# Patient Record
Sex: MALE | Race: BLACK OR AFRICAN AMERICAN | NOT HISPANIC OR LATINO | URBAN - METROPOLITAN AREA
[De-identification: names, ages, dates, MRNs, and addresses within clinical notes are randomized per-mention and may not be internally consistent; named-entity substitution may affect disease eponyms.]

---

## 2023-11-23 ENCOUNTER — APPOINTMENT (OUTPATIENT)
Dept: RADIOLOGY | Facility: MEDICAL CENTER | Age: 38
DRG: 699 | End: 2023-11-23
Attending: EMERGENCY MEDICINE
Payer: COMMERCIAL

## 2023-11-23 ENCOUNTER — HOSPITAL ENCOUNTER (INPATIENT)
Facility: MEDICAL CENTER | Age: 38
LOS: 1 days | DRG: 699 | End: 2023-11-24
Attending: EMERGENCY MEDICINE | Admitting: STUDENT IN AN ORGANIZED HEALTH CARE EDUCATION/TRAINING PROGRAM
Payer: COMMERCIAL

## 2023-11-23 DIAGNOSIS — G89.4 CHRONIC PAIN SYNDROME: ICD-10-CM

## 2023-11-23 DIAGNOSIS — Z89.511 S/P BKA (BELOW KNEE AMPUTATION) BILATERAL (HCC): ICD-10-CM

## 2023-11-23 DIAGNOSIS — E66.01 CLASS 2 SEVERE OBESITY DUE TO EXCESS CALORIES WITH SERIOUS COMORBIDITY IN ADULT, UNSPECIFIED BMI (HCC): ICD-10-CM

## 2023-11-23 DIAGNOSIS — Z99.2 ESRD NEEDING DIALYSIS (HCC): ICD-10-CM

## 2023-11-23 DIAGNOSIS — N18.6 ESRD NEEDING DIALYSIS (HCC): ICD-10-CM

## 2023-11-23 DIAGNOSIS — I16.0 HYPERTENSIVE URGENCY: ICD-10-CM

## 2023-11-23 DIAGNOSIS — F41.9 ANXIETY: ICD-10-CM

## 2023-11-23 DIAGNOSIS — R11.2 NAUSEA VOMITING AND DIARRHEA: ICD-10-CM

## 2023-11-23 DIAGNOSIS — E87.20 METABOLIC ACIDOSIS: ICD-10-CM

## 2023-11-23 DIAGNOSIS — R11.2 INTRACTABLE NAUSEA AND VOMITING: ICD-10-CM

## 2023-11-23 DIAGNOSIS — Z89.512 S/P BKA (BELOW KNEE AMPUTATION) BILATERAL (HCC): ICD-10-CM

## 2023-11-23 DIAGNOSIS — E08.21 DIABETES MELLITUS DUE TO UNDERLYING CONDITION WITH DIABETIC NEPHROPATHY, WITHOUT LONG-TERM CURRENT USE OF INSULIN (HCC): ICD-10-CM

## 2023-11-23 DIAGNOSIS — D63.8 ANEMIA OF CHRONIC DISEASE: ICD-10-CM

## 2023-11-23 DIAGNOSIS — R19.7 NAUSEA VOMITING AND DIARRHEA: ICD-10-CM

## 2023-11-23 LAB
ANION GAP SERPL CALC-SCNC: 21 MMOL/L (ref 7–16)
BASOPHILS # BLD AUTO: 0.4 % (ref 0–1.8)
BASOPHILS # BLD: 0.04 K/UL (ref 0–0.12)
BUN SERPL-MCNC: 72 MG/DL (ref 8–22)
CALCIUM SERPL-MCNC: 8.1 MG/DL (ref 8.5–10.5)
CHLORIDE SERPL-SCNC: 101 MMOL/L (ref 96–112)
CO2 SERPL-SCNC: 16 MMOL/L (ref 20–33)
CORTIS SERPL-MCNC: 14.6 UG/DL (ref 0–23)
CREAT SERPL-MCNC: 16.89 MG/DL (ref 0.5–1.4)
EKG IMPRESSION: NORMAL
EOSINOPHIL # BLD AUTO: 0.35 K/UL (ref 0–0.51)
EOSINOPHIL NFR BLD: 3.7 % (ref 0–6.9)
ERYTHROCYTE [DISTWIDTH] IN BLOOD BY AUTOMATED COUNT: 50.1 FL (ref 35.9–50)
GFR SERPLBLD CREATININE-BSD FMLA CKD-EPI: 3 ML/MIN/1.73 M 2
GLUCOSE SERPL-MCNC: 92 MG/DL (ref 65–99)
HCT VFR BLD AUTO: 23.6 % (ref 42–52)
HGB BLD-MCNC: 7.8 G/DL (ref 14–18)
IMM GRANULOCYTES # BLD AUTO: 0.02 K/UL (ref 0–0.11)
IMM GRANULOCYTES NFR BLD AUTO: 0.2 % (ref 0–0.9)
INR PPP: 1.16 (ref 0.87–1.13)
LACTATE SERPL-SCNC: 1.3 MMOL/L (ref 0.5–2)
LIPASE SERPL-CCNC: 13 U/L (ref 11–82)
LYMPHOCYTES # BLD AUTO: 1.05 K/UL (ref 1–4.8)
LYMPHOCYTES NFR BLD: 11.1 % (ref 22–41)
MCH RBC QN AUTO: 26.9 PG (ref 27–33)
MCHC RBC AUTO-ENTMCNC: 33.1 G/DL (ref 32.3–36.5)
MCV RBC AUTO: 81.4 FL (ref 81.4–97.8)
MONOCYTES # BLD AUTO: 0.6 K/UL (ref 0–0.85)
MONOCYTES NFR BLD AUTO: 6.3 % (ref 0–13.4)
NEUTROPHILS # BLD AUTO: 7.43 K/UL (ref 1.82–7.42)
NEUTROPHILS NFR BLD: 78.3 % (ref 44–72)
NRBC # BLD AUTO: 0 K/UL
NRBC BLD-RTO: 0 /100 WBC (ref 0–0.2)
NT-PROBNP SERPL IA-MCNC: ABNORMAL PG/ML (ref 0–125)
PLATELET # BLD AUTO: 197 K/UL (ref 164–446)
PMV BLD AUTO: 9.7 FL (ref 9–12.9)
POTASSIUM SERPL-SCNC: 4.1 MMOL/L (ref 3.6–5.5)
PROCALCITONIN SERPL-MCNC: 0.29 NG/ML
PROTHROMBIN TIME: 14.9 SEC (ref 12–14.6)
RBC # BLD AUTO: 2.9 M/UL (ref 4.7–6.1)
SODIUM SERPL-SCNC: 138 MMOL/L (ref 135–145)
TROPONIN T SERPL-MCNC: 420 NG/L (ref 6–19)
WBC # BLD AUTO: 9.5 K/UL (ref 4.8–10.8)

## 2023-11-23 PROCEDURE — 99285 EMERGENCY DEPT VISIT HI MDM: CPT

## 2023-11-23 PROCEDURE — 770020 HCHG ROOM/CARE - TELE (206)

## 2023-11-23 PROCEDURE — 93005 ELECTROCARDIOGRAM TRACING: CPT | Performed by: EMERGENCY MEDICINE

## 2023-11-23 PROCEDURE — 80048 BASIC METABOLIC PNL TOTAL CA: CPT

## 2023-11-23 PROCEDURE — 83880 ASSAY OF NATRIURETIC PEPTIDE: CPT

## 2023-11-23 PROCEDURE — A9270 NON-COVERED ITEM OR SERVICE: HCPCS | Performed by: STUDENT IN AN ORGANIZED HEALTH CARE EDUCATION/TRAINING PROGRAM

## 2023-11-23 PROCEDURE — 85025 COMPLETE CBC W/AUTO DIFF WBC: CPT

## 2023-11-23 PROCEDURE — 700111 HCHG RX REV CODE 636 W/ 250 OVERRIDE (IP): Mod: JZ | Performed by: EMERGENCY MEDICINE

## 2023-11-23 PROCEDURE — 96375 TX/PRO/DX INJ NEW DRUG ADDON: CPT

## 2023-11-23 PROCEDURE — 36415 COLL VENOUS BLD VENIPUNCTURE: CPT

## 2023-11-23 PROCEDURE — 96374 THER/PROPH/DIAG INJ IV PUSH: CPT

## 2023-11-23 PROCEDURE — 83605 ASSAY OF LACTIC ACID: CPT

## 2023-11-23 PROCEDURE — 85610 PROTHROMBIN TIME: CPT

## 2023-11-23 PROCEDURE — 83690 ASSAY OF LIPASE: CPT

## 2023-11-23 PROCEDURE — 84145 PROCALCITONIN (PCT): CPT

## 2023-11-23 PROCEDURE — 87040 BLOOD CULTURE FOR BACTERIA: CPT | Mod: 91

## 2023-11-23 PROCEDURE — 700102 HCHG RX REV CODE 250 W/ 637 OVERRIDE(OP): Performed by: STUDENT IN AN ORGANIZED HEALTH CARE EDUCATION/TRAINING PROGRAM

## 2023-11-23 PROCEDURE — 71045 X-RAY EXAM CHEST 1 VIEW: CPT

## 2023-11-23 PROCEDURE — 96376 TX/PRO/DX INJ SAME DRUG ADON: CPT

## 2023-11-23 PROCEDURE — 700111 HCHG RX REV CODE 636 W/ 250 OVERRIDE (IP): Mod: JZ | Performed by: STUDENT IN AN ORGANIZED HEALTH CARE EDUCATION/TRAINING PROGRAM

## 2023-11-23 PROCEDURE — 84484 ASSAY OF TROPONIN QUANT: CPT

## 2023-11-23 PROCEDURE — 99223 1ST HOSP IP/OBS HIGH 75: CPT | Mod: A1 | Performed by: STUDENT IN AN ORGANIZED HEALTH CARE EDUCATION/TRAINING PROGRAM

## 2023-11-23 PROCEDURE — 82533 TOTAL CORTISOL: CPT

## 2023-11-23 RX ORDER — LABETALOL HYDROCHLORIDE 5 MG/ML
10 INJECTION, SOLUTION INTRAVENOUS ONCE
Status: COMPLETED | OUTPATIENT
Start: 2023-11-23 | End: 2023-11-23

## 2023-11-23 RX ORDER — CLONAZEPAM 0.5 MG/1
0.5 TABLET ORAL 2 TIMES DAILY
Status: DISCONTINUED | OUTPATIENT
Start: 2023-11-23 | End: 2023-11-24 | Stop reason: HOSPADM

## 2023-11-23 RX ORDER — ACETAMINOPHEN 325 MG/1
650 TABLET ORAL EVERY 6 HOURS PRN
Status: DISCONTINUED | OUTPATIENT
Start: 2023-11-23 | End: 2023-11-24 | Stop reason: HOSPADM

## 2023-11-23 RX ORDER — ONDANSETRON 2 MG/ML
4 INJECTION INTRAMUSCULAR; INTRAVENOUS ONCE
Status: COMPLETED | OUTPATIENT
Start: 2023-11-23 | End: 2023-11-23

## 2023-11-23 RX ORDER — DIPHENHYDRAMINE HYDROCHLORIDE 50 MG/ML
25 INJECTION INTRAMUSCULAR; INTRAVENOUS ONCE
Status: COMPLETED | OUTPATIENT
Start: 2023-11-23 | End: 2023-11-23

## 2023-11-23 RX ORDER — HEPARIN SODIUM 5000 [USP'U]/ML
5000 INJECTION, SOLUTION INTRAVENOUS; SUBCUTANEOUS EVERY 8 HOURS
Status: DISCONTINUED | OUTPATIENT
Start: 2023-11-24 | End: 2023-11-24 | Stop reason: HOSPADM

## 2023-11-23 RX ORDER — INSULIN GLARGINE 100 [IU]/ML
22 INJECTION, SOLUTION SUBCUTANEOUS EVERY EVENING
COMMUNITY

## 2023-11-23 RX ORDER — MORPHINE SULFATE 4 MG/ML
4 INJECTION INTRAVENOUS ONCE
Status: COMPLETED | OUTPATIENT
Start: 2023-11-23 | End: 2023-11-23

## 2023-11-23 RX ORDER — POLYETHYLENE GLYCOL 3350 17 G/17G
1 POWDER, FOR SOLUTION ORAL
Status: DISCONTINUED | OUTPATIENT
Start: 2023-11-23 | End: 2023-11-24 | Stop reason: HOSPADM

## 2023-11-23 RX ORDER — SODIUM CHLORIDE, SODIUM LACTATE, POTASSIUM CHLORIDE, AND CALCIUM CHLORIDE .6; .31; .03; .02 G/100ML; G/100ML; G/100ML; G/100ML
500 INJECTION, SOLUTION INTRAVENOUS
Status: DISCONTINUED | OUTPATIENT
Start: 2023-11-23 | End: 2023-11-24 | Stop reason: HOSPADM

## 2023-11-23 RX ORDER — ASPIRIN 81 MG/1
81 TABLET ORAL DAILY
Status: DISCONTINUED | OUTPATIENT
Start: 2023-11-24 | End: 2023-11-24 | Stop reason: HOSPADM

## 2023-11-23 RX ORDER — METOCLOPRAMIDE HYDROCHLORIDE 5 MG/ML
5 INJECTION INTRAMUSCULAR; INTRAVENOUS EVERY 6 HOURS
Status: DISCONTINUED | OUTPATIENT
Start: 2023-11-24 | End: 2023-11-24 | Stop reason: HOSPADM

## 2023-11-23 RX ORDER — PROCHLORPERAZINE EDISYLATE 5 MG/ML
5-10 INJECTION INTRAMUSCULAR; INTRAVENOUS EVERY 4 HOURS PRN
Status: DISCONTINUED | OUTPATIENT
Start: 2023-11-23 | End: 2023-11-24 | Stop reason: HOSPADM

## 2023-11-23 RX ORDER — BISACODYL 10 MG
10 SUPPOSITORY, RECTAL RECTAL
Status: DISCONTINUED | OUTPATIENT
Start: 2023-11-23 | End: 2023-11-24 | Stop reason: HOSPADM

## 2023-11-23 RX ORDER — PROMETHAZINE HYDROCHLORIDE 25 MG/1
12.5-25 TABLET ORAL EVERY 4 HOURS PRN
Status: DISCONTINUED | OUTPATIENT
Start: 2023-11-23 | End: 2023-11-24 | Stop reason: HOSPADM

## 2023-11-23 RX ORDER — LABETALOL HYDROCHLORIDE 5 MG/ML
10 INJECTION, SOLUTION INTRAVENOUS EVERY 4 HOURS PRN
Status: DISCONTINUED | OUTPATIENT
Start: 2023-11-23 | End: 2023-11-24 | Stop reason: HOSPADM

## 2023-11-23 RX ORDER — AMLODIPINE BESYLATE 5 MG/1
5 TABLET ORAL DAILY
COMMUNITY

## 2023-11-23 RX ORDER — OXYCODONE HYDROCHLORIDE 10 MG/1
10 TABLET ORAL EVERY 6 HOURS PRN
COMMUNITY

## 2023-11-23 RX ORDER — ONDANSETRON 2 MG/ML
4 INJECTION INTRAMUSCULAR; INTRAVENOUS EVERY 4 HOURS PRN
Status: DISCONTINUED | OUTPATIENT
Start: 2023-11-23 | End: 2023-11-24 | Stop reason: HOSPADM

## 2023-11-23 RX ORDER — CLONAZEPAM 0.5 MG/1
0.5 TABLET ORAL 2 TIMES DAILY
Status: ON HOLD | COMMUNITY
End: 2023-11-24 | Stop reason: SDUPTHER

## 2023-11-23 RX ORDER — AMOXICILLIN 250 MG
2 CAPSULE ORAL 2 TIMES DAILY
Status: DISCONTINUED | OUTPATIENT
Start: 2023-11-23 | End: 2023-11-24 | Stop reason: HOSPADM

## 2023-11-23 RX ORDER — OXYCODONE HYDROCHLORIDE 10 MG/1
10 TABLET ORAL EVERY 6 HOURS PRN
Status: DISCONTINUED | OUTPATIENT
Start: 2023-11-23 | End: 2023-11-24 | Stop reason: HOSPADM

## 2023-11-23 RX ORDER — AMLODIPINE BESYLATE 10 MG/1
10 TABLET ORAL
Status: DISCONTINUED | OUTPATIENT
Start: 2023-11-23 | End: 2023-11-24 | Stop reason: HOSPADM

## 2023-11-23 RX ORDER — HYDROMORPHONE HYDROCHLORIDE 1 MG/ML
0.5 INJECTION, SOLUTION INTRAMUSCULAR; INTRAVENOUS; SUBCUTANEOUS EVERY 4 HOURS PRN
Status: DISCONTINUED | OUTPATIENT
Start: 2023-11-23 | End: 2023-11-24 | Stop reason: HOSPADM

## 2023-11-23 RX ORDER — ATORVASTATIN CALCIUM 40 MG/1
40 TABLET, FILM COATED ORAL EVERY EVENING
Status: DISCONTINUED | OUTPATIENT
Start: 2023-11-23 | End: 2023-11-24 | Stop reason: HOSPADM

## 2023-11-23 RX ORDER — HYDROMORPHONE HYDROCHLORIDE 1 MG/ML
1 INJECTION, SOLUTION INTRAMUSCULAR; INTRAVENOUS; SUBCUTANEOUS ONCE
Status: COMPLETED | OUTPATIENT
Start: 2023-11-23 | End: 2023-11-23

## 2023-11-23 RX ORDER — PROMETHAZINE HYDROCHLORIDE 25 MG/1
12.5-25 SUPPOSITORY RECTAL EVERY 4 HOURS PRN
Status: DISCONTINUED | OUTPATIENT
Start: 2023-11-23 | End: 2023-11-24 | Stop reason: HOSPADM

## 2023-11-23 RX ORDER — ONDANSETRON 4 MG/1
4 TABLET, ORALLY DISINTEGRATING ORAL EVERY 4 HOURS PRN
Status: DISCONTINUED | OUTPATIENT
Start: 2023-11-23 | End: 2023-11-24 | Stop reason: HOSPADM

## 2023-11-23 RX ADMIN — MORPHINE SULFATE 4 MG: 4 INJECTION, SOLUTION INTRAMUSCULAR; INTRAVENOUS at 18:48

## 2023-11-23 RX ADMIN — ONDANSETRON 4 MG: 2 INJECTION INTRAMUSCULAR; INTRAVENOUS at 22:35

## 2023-11-23 RX ADMIN — DIPHENHYDRAMINE HYDROCHLORIDE 25 MG: 50 INJECTION, SOLUTION INTRAMUSCULAR; INTRAVENOUS at 22:48

## 2023-11-23 RX ADMIN — CLONAZEPAM 0.5 MG: 0.5 TABLET ORAL at 23:56

## 2023-11-23 RX ADMIN — HYDROMORPHONE HYDROCHLORIDE 1 MG: 1 INJECTION, SOLUTION INTRAMUSCULAR; INTRAVENOUS; SUBCUTANEOUS at 22:35

## 2023-11-23 RX ADMIN — ATORVASTATIN CALCIUM 40 MG: 40 TABLET, FILM COATED ORAL at 23:56

## 2023-11-23 RX ADMIN — METOCLOPRAMIDE 5 MG: 5 INJECTION, SOLUTION INTRAMUSCULAR; INTRAVENOUS at 23:55

## 2023-11-23 RX ADMIN — DIPHENHYDRAMINE HYDROCHLORIDE 25 MG: 50 INJECTION, SOLUTION INTRAMUSCULAR; INTRAVENOUS at 23:55

## 2023-11-23 RX ADMIN — LABETALOL HYDROCHLORIDE 10 MG: 5 INJECTION, SOLUTION INTRAVENOUS at 18:41

## 2023-11-23 RX ADMIN — ONDANSETRON 4 MG: 2 INJECTION INTRAMUSCULAR; INTRAVENOUS at 18:48

## 2023-11-23 RX ADMIN — LABETALOL HYDROCHLORIDE 10 MG: 5 INJECTION, SOLUTION INTRAVENOUS at 23:55

## 2023-11-23 RX ADMIN — HYDROMORPHONE HYDROCHLORIDE 1 MG: 1 INJECTION, SOLUTION INTRAMUSCULAR; INTRAVENOUS; SUBCUTANEOUS at 19:45

## 2023-11-24 VITALS
SYSTOLIC BLOOD PRESSURE: 144 MMHG | BODY MASS INDEX: 37.12 KG/M2 | DIASTOLIC BLOOD PRESSURE: 76 MMHG | OXYGEN SATURATION: 96 % | HEART RATE: 92 BPM | TEMPERATURE: 96.8 F | WEIGHT: 289.24 LBS | RESPIRATION RATE: 16 BRPM | HEIGHT: 74 IN

## 2023-11-24 PROBLEM — R11.2 INTRACTABLE NAUSEA AND VOMITING: Status: ACTIVE | Noted: 2023-11-24

## 2023-11-24 PROBLEM — D63.8 ANEMIA OF CHRONIC DISEASE: Status: ACTIVE | Noted: 2023-11-24

## 2023-11-24 PROBLEM — I16.0 HYPERTENSIVE URGENCY: Status: ACTIVE | Noted: 2023-11-24

## 2023-11-24 PROBLEM — E11.9 DIABETES (HCC): Status: ACTIVE | Noted: 2023-11-24

## 2023-11-24 PROBLEM — T87.89 NON-HEALING WOUND OF AMPUTATION STUMP (HCC): Status: ACTIVE | Noted: 2023-11-24

## 2023-11-24 PROBLEM — G89.4 CHRONIC PAIN SYNDROME: Status: ACTIVE | Noted: 2023-11-24

## 2023-11-24 PROBLEM — E66.9 CLASS 2 OBESITY IN ADULT: Status: ACTIVE | Noted: 2023-11-24

## 2023-11-24 PROBLEM — E87.20 METABOLIC ACIDOSIS: Status: ACTIVE | Noted: 2023-11-24

## 2023-11-24 PROBLEM — Z89.511 S/P BKA (BELOW KNEE AMPUTATION) BILATERAL (HCC): Status: ACTIVE | Noted: 2023-11-24

## 2023-11-24 PROBLEM — I16.1 HYPERTENSIVE EMERGENCY: Status: ACTIVE | Noted: 2023-11-24

## 2023-11-24 PROBLEM — Z89.512 S/P BKA (BELOW KNEE AMPUTATION) BILATERAL (HCC): Status: ACTIVE | Noted: 2023-11-24

## 2023-11-24 PROBLEM — I16.0 HYPERTENSIVE URGENCY: Status: RESOLVED | Noted: 2023-11-24 | Resolved: 2023-11-24

## 2023-11-24 LAB
ABO + RH BLD: NORMAL
ABO GROUP BLD: NORMAL
ALBUMIN SERPL BCP-MCNC: 2.7 G/DL (ref 3.2–4.9)
BARCODED ABORH UBTYP: 5100
BARCODED PRD CODE UBPRD: NORMAL
BARCODED UNIT NUM UBUNT: NORMAL
BASOPHILS # BLD AUTO: 0.6 % (ref 0–1.8)
BASOPHILS # BLD: 0.05 K/UL (ref 0–0.12)
BLD GP AB SCN SERPL QL: NORMAL
BUN SERPL-MCNC: 77 MG/DL (ref 8–22)
CALCIUM ALBUM COR SERPL-MCNC: 8.4 MG/DL (ref 8.5–10.5)
CALCIUM SERPL-MCNC: 7.4 MG/DL (ref 8.5–10.5)
CHLORIDE SERPL-SCNC: 100 MMOL/L (ref 96–112)
CO2 SERPL-SCNC: 17 MMOL/L (ref 20–33)
COMPONENT R 8504R: NORMAL
CREAT SERPL-MCNC: 17.49 MG/DL (ref 0.5–1.4)
EOSINOPHIL # BLD AUTO: 0.64 K/UL (ref 0–0.51)
EOSINOPHIL NFR BLD: 7.3 % (ref 0–6.9)
ERYTHROCYTE [DISTWIDTH] IN BLOOD BY AUTOMATED COUNT: 49.7 FL (ref 35.9–50)
EST. AVERAGE GLUCOSE BLD GHB EST-MCNC: 128 MG/DL
GFR SERPLBLD CREATININE-BSD FMLA CKD-EPI: 3 ML/MIN/1.73 M 2
GLUCOSE BLD STRIP.AUTO-MCNC: 136 MG/DL (ref 65–99)
GLUCOSE BLD STRIP.AUTO-MCNC: 78 MG/DL (ref 65–99)
GLUCOSE SERPL-MCNC: 97 MG/DL (ref 65–99)
HAV IGM SERPL QL IA: NORMAL
HAV IGM SERPL QL IA: NORMAL
HBA1C MFR BLD: 6.1 % (ref 4–5.6)
HBV CORE IGM SER QL: NORMAL
HBV CORE IGM SER QL: NORMAL
HBV SURFACE AB SERPL IA-ACNC: 15.1 MIU/ML (ref 0–10)
HBV SURFACE AG SER QL: NORMAL
HBV SURFACE AG SER QL: NORMAL
HCT VFR BLD AUTO: 20.3 % (ref 42–52)
HCV AB SER QL: NORMAL
HCV AB SER QL: NORMAL
HGB BLD-MCNC: 6.9 G/DL (ref 14–18)
HGB BLD-MCNC: 8.6 G/DL (ref 14–18)
IMM GRANULOCYTES # BLD AUTO: 0.03 K/UL (ref 0–0.11)
IMM GRANULOCYTES NFR BLD AUTO: 0.3 % (ref 0–0.9)
LYMPHOCYTES # BLD AUTO: 1.36 K/UL (ref 1–4.8)
LYMPHOCYTES NFR BLD: 15.6 % (ref 22–41)
MAGNESIUM SERPL-MCNC: 2.1 MG/DL (ref 1.5–2.5)
MCH RBC QN AUTO: 27 PG (ref 27–33)
MCHC RBC AUTO-ENTMCNC: 34 G/DL (ref 32.3–36.5)
MCV RBC AUTO: 79.3 FL (ref 81.4–97.8)
MONOCYTES # BLD AUTO: 1.02 K/UL (ref 0–0.85)
MONOCYTES NFR BLD AUTO: 11.7 % (ref 0–13.4)
NEUTROPHILS # BLD AUTO: 5.64 K/UL (ref 1.82–7.42)
NEUTROPHILS NFR BLD: 64.5 % (ref 44–72)
NRBC # BLD AUTO: 0 K/UL
NRBC BLD-RTO: 0 /100 WBC (ref 0–0.2)
PHOSPHATE SERPL-MCNC: 10.2 MG/DL (ref 2.5–4.5)
PLATELET # BLD AUTO: 181 K/UL (ref 164–446)
PMV BLD AUTO: 9.8 FL (ref 9–12.9)
POTASSIUM SERPL-SCNC: 3.9 MMOL/L (ref 3.6–5.5)
PRODUCT TYPE UPROD: NORMAL
RBC # BLD AUTO: 2.56 M/UL (ref 4.7–6.1)
RH BLD: NORMAL
SODIUM SERPL-SCNC: 136 MMOL/L (ref 135–145)
UNIT STATUS USTAT: NORMAL
WBC # BLD AUTO: 8.7 K/UL (ref 4.8–10.8)

## 2023-11-24 PROCEDURE — 86901 BLOOD TYPING SEROLOGIC RH(D): CPT

## 2023-11-24 PROCEDURE — 85025 COMPLETE CBC W/AUTO DIFF WBC: CPT

## 2023-11-24 PROCEDURE — 83735 ASSAY OF MAGNESIUM: CPT

## 2023-11-24 PROCEDURE — 85018 HEMOGLOBIN: CPT

## 2023-11-24 PROCEDURE — 82962 GLUCOSE BLOOD TEST: CPT | Mod: 91

## 2023-11-24 PROCEDURE — 86706 HEP B SURFACE ANTIBODY: CPT

## 2023-11-24 PROCEDURE — P9016 RBC LEUKOCYTES REDUCED: HCPCS

## 2023-11-24 PROCEDURE — A9270 NON-COVERED ITEM OR SERVICE: HCPCS | Performed by: STUDENT IN AN ORGANIZED HEALTH CARE EDUCATION/TRAINING PROGRAM

## 2023-11-24 PROCEDURE — 83036 HEMOGLOBIN GLYCOSYLATED A1C: CPT

## 2023-11-24 PROCEDURE — 700111 HCHG RX REV CODE 636 W/ 250 OVERRIDE (IP): Performed by: STUDENT IN AN ORGANIZED HEALTH CARE EDUCATION/TRAINING PROGRAM

## 2023-11-24 PROCEDURE — 80069 RENAL FUNCTION PANEL: CPT

## 2023-11-24 PROCEDURE — 99239 HOSP IP/OBS DSCHRG MGMT >30: CPT | Performed by: HOSPITALIST

## 2023-11-24 PROCEDURE — 86900 BLOOD TYPING SEROLOGIC ABO: CPT

## 2023-11-24 PROCEDURE — 90935 HEMODIALYSIS ONE EVALUATION: CPT

## 2023-11-24 PROCEDURE — 36430 TRANSFUSION BLD/BLD COMPNT: CPT

## 2023-11-24 PROCEDURE — 700102 HCHG RX REV CODE 250 W/ 637 OVERRIDE(OP): Performed by: STUDENT IN AN ORGANIZED HEALTH CARE EDUCATION/TRAINING PROGRAM

## 2023-11-24 PROCEDURE — 700111 HCHG RX REV CODE 636 W/ 250 OVERRIDE (IP): Performed by: INTERNAL MEDICINE

## 2023-11-24 PROCEDURE — 99255 IP/OBS CONSLTJ NEW/EST HI 80: CPT | Performed by: INTERNAL MEDICINE

## 2023-11-24 PROCEDURE — 36415 COLL VENOUS BLD VENIPUNCTURE: CPT

## 2023-11-24 PROCEDURE — 700111 HCHG RX REV CODE 636 W/ 250 OVERRIDE (IP)

## 2023-11-24 PROCEDURE — 80074 ACUTE HEPATITIS PANEL: CPT

## 2023-11-24 PROCEDURE — 86923 COMPATIBILITY TEST ELECTRIC: CPT

## 2023-11-24 PROCEDURE — 86850 RBC ANTIBODY SCREEN: CPT

## 2023-11-24 RX ORDER — HYDRALAZINE HYDROCHLORIDE 50 MG/1
50 TABLET, FILM COATED ORAL EVERY 8 HOURS
Status: DISCONTINUED | OUTPATIENT
Start: 2023-11-24 | End: 2023-11-24 | Stop reason: HOSPADM

## 2023-11-24 RX ORDER — CLONIDINE 0.1 MG/24H
1 PATCH, EXTENDED RELEASE TRANSDERMAL
Qty: 4 PATCH | Refills: 0 | Status: SHIPPED | OUTPATIENT
Start: 2023-11-24

## 2023-11-24 RX ORDER — HEPARIN SODIUM 1000 [USP'U]/ML
INJECTION, SOLUTION INTRAVENOUS; SUBCUTANEOUS
Status: COMPLETED
Start: 2023-11-24 | End: 2023-11-24

## 2023-11-24 RX ORDER — SODIUM BICARBONATE 650 MG/1
1300 TABLET ORAL 3 TIMES DAILY
Status: DISCONTINUED | OUTPATIENT
Start: 2023-11-24 | End: 2023-11-24 | Stop reason: HOSPADM

## 2023-11-24 RX ORDER — OMEPRAZOLE 20 MG/1
20 CAPSULE, DELAYED RELEASE ORAL 2 TIMES DAILY
Status: DISCONTINUED | OUTPATIENT
Start: 2023-11-24 | End: 2023-11-24 | Stop reason: HOSPADM

## 2023-11-24 RX ORDER — CLONAZEPAM 0.5 MG/1
0.5 TABLET ORAL 2 TIMES DAILY
Qty: 10 TABLET | Refills: 0 | Status: SHIPPED | OUTPATIENT
Start: 2023-11-24 | End: 2023-11-29

## 2023-11-24 RX ORDER — HEPARIN SODIUM 1000 [USP'U]/ML
2300 INJECTION, SOLUTION INTRAVENOUS; SUBCUTANEOUS
Status: DISCONTINUED | OUTPATIENT
Start: 2023-11-24 | End: 2023-11-24 | Stop reason: HOSPADM

## 2023-11-24 RX ORDER — HEPARIN SODIUM 1000 [USP'U]/ML
3200 INJECTION, SOLUTION INTRAVENOUS; SUBCUTANEOUS
Status: DISCONTINUED | OUTPATIENT
Start: 2023-11-24 | End: 2023-11-24 | Stop reason: HOSPADM

## 2023-11-24 RX ORDER — BUTALBITAL, ACETAMINOPHEN AND CAFFEINE 50; 325; 40 MG/1; MG/1; MG/1
1 TABLET ORAL EVERY 6 HOURS PRN
Status: DISCONTINUED | OUTPATIENT
Start: 2023-11-24 | End: 2023-11-24 | Stop reason: HOSPADM

## 2023-11-24 RX ORDER — HYDRALAZINE HYDROCHLORIDE 20 MG/ML
10 INJECTION INTRAMUSCULAR; INTRAVENOUS EVERY 4 HOURS PRN
Status: DISCONTINUED | OUTPATIENT
Start: 2023-11-24 | End: 2023-11-24 | Stop reason: HOSPADM

## 2023-11-24 RX ORDER — PROMETHAZINE HYDROCHLORIDE 25 MG/1
25 TABLET ORAL EVERY 6 HOURS PRN
Qty: 20 TABLET | Refills: 0 | Status: SHIPPED | OUTPATIENT
Start: 2023-11-24 | End: 2023-11-24

## 2023-11-24 RX ORDER — CLONIDINE HYDROCHLORIDE 0.1 MG/1
0.1 TABLET ORAL EVERY 4 HOURS PRN
Status: DISCONTINUED | OUTPATIENT
Start: 2023-11-24 | End: 2023-11-24 | Stop reason: HOSPADM

## 2023-11-24 RX ORDER — CALCIUM ACETATE 667 MG/1
2001 TABLET ORAL
Qty: 180 TABLET | Refills: 0 | Status: SHIPPED | OUTPATIENT
Start: 2023-11-24

## 2023-11-24 RX ORDER — HYDRALAZINE HYDROCHLORIDE 20 MG/ML
20 INJECTION INTRAMUSCULAR; INTRAVENOUS ONCE
Status: COMPLETED | OUTPATIENT
Start: 2023-11-24 | End: 2023-11-24

## 2023-11-24 RX ORDER — CALCIUM ACETATE 667 MG/1
2001 TABLET ORAL
Status: DISCONTINUED | OUTPATIENT
Start: 2023-11-24 | End: 2023-11-24 | Stop reason: HOSPADM

## 2023-11-24 RX ORDER — FUROSEMIDE 40 MG/1
40 TABLET ORAL 2 TIMES DAILY PRN
Qty: 30 TABLET | Refills: 0 | Status: SHIPPED | OUTPATIENT
Start: 2023-11-24

## 2023-11-24 RX ADMIN — METOCLOPRAMIDE 5 MG: 5 INJECTION, SOLUTION INTRAMUSCULAR; INTRAVENOUS at 05:29

## 2023-11-24 RX ADMIN — HEPARIN SODIUM 2300 UNITS: 1000 INJECTION, SOLUTION INTRAVENOUS; SUBCUTANEOUS at 08:55

## 2023-11-24 RX ADMIN — HYDROMORPHONE HYDROCHLORIDE 0.5 MG: 1 INJECTION, SOLUTION INTRAMUSCULAR; INTRAVENOUS; SUBCUTANEOUS at 02:46

## 2023-11-24 RX ADMIN — HYDRALAZINE HYDROCHLORIDE 20 MG: 20 INJECTION, SOLUTION INTRAMUSCULAR; INTRAVENOUS at 01:10

## 2023-11-24 RX ADMIN — HYDROMORPHONE HYDROCHLORIDE 0.5 MG: 1 INJECTION, SOLUTION INTRAMUSCULAR; INTRAVENOUS; SUBCUTANEOUS at 07:06

## 2023-11-24 RX ADMIN — SODIUM BICARBONATE 1300 MG: 650 TABLET ORAL at 10:45

## 2023-11-24 RX ADMIN — OMEPRAZOLE 20 MG: 20 CAPSULE, DELAYED RELEASE ORAL at 05:29

## 2023-11-24 RX ADMIN — INSULIN GLARGINE-YFGN 10 UNITS: 100 INJECTION, SOLUTION SUBCUTANEOUS at 00:40

## 2023-11-24 RX ADMIN — METOCLOPRAMIDE 5 MG: 5 INJECTION, SOLUTION INTRAMUSCULAR; INTRAVENOUS at 12:47

## 2023-11-24 RX ADMIN — HYDRALAZINE HYDROCHLORIDE 10 MG: 20 INJECTION, SOLUTION INTRAMUSCULAR; INTRAVENOUS at 05:32

## 2023-11-24 RX ADMIN — HEPARIN SODIUM 3200 UNITS: 1000 INJECTION, SOLUTION INTRAVENOUS; SUBCUTANEOUS at 13:00

## 2023-11-24 RX ADMIN — HYDRALAZINE HYDROCHLORIDE 50 MG: 50 TABLET, FILM COATED ORAL at 14:28

## 2023-11-24 RX ADMIN — HYDROMORPHONE HYDROCHLORIDE 0.5 MG: 1 INJECTION, SOLUTION INTRAMUSCULAR; INTRAVENOUS; SUBCUTANEOUS at 14:53

## 2023-11-24 RX ADMIN — SODIUM BICARBONATE 1300 MG: 650 TABLET ORAL at 14:28

## 2023-11-24 RX ADMIN — HYDROMORPHONE HYDROCHLORIDE 0.5 MG: 1 INJECTION, SOLUTION INTRAMUSCULAR; INTRAVENOUS; SUBCUTANEOUS at 10:45

## 2023-11-24 ASSESSMENT — ENCOUNTER SYMPTOMS
HEARTBURN: 1
WEAKNESS: 1
PALPITATIONS: 0
DOUBLE VISION: 0
ABDOMINAL PAIN: 1
NAUSEA: 1
MYALGIAS: 1
DIZZINESS: 0
DEPRESSION: 0
FEVER: 0
HEADACHES: 1
BLURRED VISION: 0
CHILLS: 0
VOMITING: 1
BACK PAIN: 1
SHORTNESS OF BREATH: 0
BRUISES/BLEEDS EASILY: 0
COUGH: 0

## 2023-11-24 ASSESSMENT — COGNITIVE AND FUNCTIONAL STATUS - GENERAL
WALKING IN HOSPITAL ROOM: TOTAL
MOVING FROM LYING ON BACK TO SITTING ON SIDE OF FLAT BED: A LOT
HELP NEEDED FOR BATHING: A LITTLE
SUGGESTED CMS G CODE MODIFIER MOBILITY: CL
MOBILITY SCORE: 13
DAILY ACTIVITIY SCORE: 20
STANDING UP FROM CHAIR USING ARMS: A LOT
TOILETING: A LOT
CLIMB 3 TO 5 STEPS WITH RAILING: TOTAL
DRESSING REGULAR LOWER BODY CLOTHING: A LITTLE
MOVING TO AND FROM BED TO CHAIR: A LITTLE
SUGGESTED CMS G CODE MODIFIER DAILY ACTIVITY: CJ

## 2023-11-24 ASSESSMENT — LIFESTYLE VARIABLES
TOTAL SCORE: 0
DOES PATIENT WANT TO STOP DRINKING: NO
TOTAL SCORE: 0
SUBSTANCE_ABUSE: 0
HAVE YOU EVER FELT YOU SHOULD CUT DOWN ON YOUR DRINKING: NO
EVER FELT BAD OR GUILTY ABOUT YOUR DRINKING: NO
TOTAL SCORE: 0
ALCOHOL_USE: NO
CONSUMPTION TOTAL: NEGATIVE
EVER HAD A DRINK FIRST THING IN THE MORNING TO STEADY YOUR NERVES TO GET RID OF A HANGOVER: NO
HAVE PEOPLE ANNOYED YOU BY CRITICIZING YOUR DRINKING: NO
HOW MANY TIMES IN THE PAST YEAR HAVE YOU HAD 5 OR MORE DRINKS IN A DAY: 0
AVERAGE NUMBER OF DAYS PER WEEK YOU HAVE A DRINK CONTAINING ALCOHOL: 0
ON A TYPICAL DAY WHEN YOU DRINK ALCOHOL HOW MANY DRINKS DO YOU HAVE: 0

## 2023-11-24 ASSESSMENT — FIBROSIS 4 INDEX
FIB4 SCORE: 0.63
FIB4 SCORE: 0.63

## 2023-11-24 ASSESSMENT — PATIENT HEALTH QUESTIONNAIRE - PHQ9
1. LITTLE INTEREST OR PLEASURE IN DOING THINGS: NOT AT ALL
2. FEELING DOWN, DEPRESSED, IRRITABLE, OR HOPELESS: NOT AT ALL
SUM OF ALL RESPONSES TO PHQ9 QUESTIONS 1 AND 2: 0

## 2023-11-24 ASSESSMENT — PAIN DESCRIPTION - PAIN TYPE: TYPE: ACUTE PAIN

## 2023-11-24 NOTE — CARE PLAN
Problem: Pain - Standard  Goal: Alleviation of pain or a reduction in pain to the patient’s comfort goal  Outcome: Progressing     Problem: Knowledge Deficit - Standard  Goal: Patient and family/care givers will demonstrate understanding of plan of care, disease process/condition, diagnostic tests and medications  Outcome: Progressing     Problem: Hemodynamics  Goal: Patient's hemodynamics, fluid balance and neurologic status will be stable or improve  Outcome: Progressing     Problem: Fluid Volume  Goal: Fluid volume balance will be maintained  Outcome: Progressing     Problem: Urinary - Renal Perfusion  Goal: Ability to achieve and maintain adequate renal perfusion and functioning will improve  Outcome: Progressing     Problem: Respiratory  Goal: Patient will achieve/maintain optimum respiratory ventilation and gas exchange  Outcome: Progressing     Problem: Physical Regulation  Goal: Diagnostic test results will improve  Outcome: Progressing  Goal: Signs and symptoms of infection will decrease  Outcome: Progressing     Problem: Skin Integrity  Goal: Skin integrity is maintained or improved  Outcome: Progressing     Problem: Fall Risk  Goal: Patient will remain free from falls  Outcome: Progressing   The patient is Watcher - Medium risk of patient condition declining or worsening    Shift Goals  Clinical Goals: VSS, safety, HD  Patient Goals: to go home  Family Goals: RANI'    Progress made toward(s) clinical / shift goals:  VSS, HD     Patient is not progressing towards the following goals:       no known mental health issues.

## 2023-11-24 NOTE — ED NOTES
Med rec updated and complete. Allergies reviewed.   Confirmed name and date of birth.  Pt denies anticoagulant  and antiplatelet medications.  No outpatient antibiotic use in last 30 days.      Pt has been out of his clonazepam and oxycodone       Pt is from [place] will need to use   Pt will need to use    [facility] = [phone]

## 2023-11-24 NOTE — PROGRESS NOTES
4 Eyes Skin Assessment Completed by ADRIAN Ellis and FRANK Avila.    Head WDL  Ears WDL  Nose WDL  Mouth WDL  Neck WDL  Breast/Chest WDL  Shoulder Blades WDL  Spine WDL  (R) Arm/Elbow/Hand WDL  (L) Arm/Elbow/Hand WDL  Abdomen WDL  Groin WDL  Scrotum/Coccyx/Buttocks WDL  (R) Leg abrasions  (L) Leg BKA   (R) Heel/Foot/Toe BKA  (L) Heel/Foot/Toe BKA          Devices In Places Tele Box and Pulse Ox      Interventions In Place N/A    Possible Skin Injury No    Pictures Uploaded Into Epic N/A  Wound Consult Placed N/A  RN Wound Prevention Protocol Ordered No

## 2023-11-24 NOTE — PROGRESS NOTES
This RN reported critical phos of 10.2 and critical Creatinine to Dr. Garcia. MD expresses understanding.

## 2023-11-24 NOTE — H&P
Hospital Medicine History & Physical Note    Date of Service  11/23/2023    Primary Care Physician  Pcp Not In Computer    Consultants  Nephrology (Dr. Hummel)    Code Status  Full Code    Chief Complaint  Chief Complaint   Patient presents with    N/V    Other     Last dialysis last Saturday. Normally gets dialysis T, TH, S       History of Presenting Illness  Theodore Mc is a 38 y.o. morbidly obese male with history of ESRD on HD TRS, diabetes, hypertension, PVD s/p b/l BKA, chronic pain syndrome dependent opioid who presented 11/23/2023 with evaluation for intractable nausea and vomiting.  Patient is visiting from [facility], reported having intractable nausea and vomiting.  He reported he has not been able to tolerate oral diet, and unable to keep down his pain medication.  In ER, noted to have BUN 72, creatinine 16.89, potassium 4.1.  His SBP>220. CXR mild vascular congestion. He was given labetalol 10mg IV x2 in ED. Nephrology consulted, tentative plan for HD. Admitted to medicine service for further evaluation and treatment.    I discussed the plan of care with patient and bedside RN, nephrology.    Review of Systems  Review of Systems   Constitutional:  Negative for chills and fever.   HENT:  Negative for hearing loss and tinnitus.    Eyes:  Negative for blurred vision and double vision.   Respiratory:  Negative for cough and shortness of breath.    Cardiovascular:  Negative for chest pain and palpitations.   Gastrointestinal:  Positive for abdominal pain, heartburn, nausea and vomiting.   Genitourinary:  Negative for dysuria and hematuria.   Musculoskeletal:  Positive for back pain and myalgias.   Skin:  Negative for itching and rash.   Neurological:  Positive for weakness and headaches. Negative for dizziness.   Endo/Heme/Allergies:  Negative for environmental allergies. Does not bruise/bleed easily.   Psychiatric/Behavioral:  Negative for depression and substance abuse.    All other systems  reviewed and are negative.      Past Medical History   has a past medical history of Diabetes (HCC) and Renal disorder.    Surgical History   has no past surgical history on file.   S/p b/l BKA    Family History  family history is not on file.   Family history reviewed with patient. There is no family history that is pertinent to the chief complaint.     Social History   reports that he has quit smoking. His smoking use included cigarettes. He does not have any smokeless tobacco history on file. He reports current drug use. He reports that he does not drink alcohol.    Allergies  Allergies   Allergen Reactions    Gabapentin Anxiety       Medications  Prior to Admission Medications   Prescriptions Last Dose Informant Patient Reported? Taking?   amLODIPine (NORVASC) 5 MG Tab 11/22/2023 at 0800 Patient Yes Yes   Sig: Take 5 mg by mouth every day.   clonazePAM (KLONOPIN) 0.5 MG Tab > 5 days at unknown Patient Yes Yes   Sig: Take 0.5 mg by mouth 2 times a day.   insulin glargine (LANTUS SOLOSTAR) 100 UNIT/ML Solution Pen-injector injection 11/22/2023 at 2100 Patient Yes Yes   Sig: Inject 22 Units under the skin every evening.   insulin regular (HUMULIN R) 100 Unit/mL Solution 11/22/2023 at 1730 Patient Yes Yes   Sig: Inject 2-10 Units under the skin 3 times a day before meals. SLIDING SCALE   151-200 = 2 units  201-250 = 4  units  251-300 = 6 units  301-350 = 8  units  351-400 = 10 units   oxyCODONE immediate release (ROXICODONE) 10 MG immediate release tablet > 1 week at unknown Patient Yes Yes   Sig: Take 10 mg by mouth every 6 hours as needed for Moderate Pain.      Facility-Administered Medications: None       Physical Exam  Temp:  [36.8 °C (98.3 °F)-37.1 °C (98.8 °F)] 37.1 °C (98.8 °F)  Pulse:  [] 92  Resp:  [16-20] 18  BP: (168-227)/() 168/99  SpO2:  [97 %-100 %] 97 %  Blood Pressure: (!) 188/98   Temperature: 36.8 °C (98.3 °F)   Pulse: 95   Respiration: 17   Pulse Oximetry: 97 %       Physical  Exam  Vitals and nursing note reviewed.   Constitutional:       Appearance: He is obese. He is ill-appearing.   HENT:      Head: Normocephalic and atraumatic.      Nose: Nose normal.      Mouth/Throat:      Mouth: Mucous membranes are moist.      Pharynx: Oropharynx is clear.   Eyes:      General: No scleral icterus.     Extraocular Movements: Extraocular movements intact.   Cardiovascular:      Rate and Rhythm: Regular rhythm. Tachycardia present.      Pulses: Normal pulses.      Heart sounds:      No friction rub.   Pulmonary:      Effort: No respiratory distress.      Breath sounds: Rales present. No wheezing.   Chest:      Chest wall: No tenderness.   Abdominal:      General: There is distension.      Tenderness: There is no abdominal tenderness. There is no guarding or rebound.   Musculoskeletal:      Cervical back: Neck supple. No tenderness.      Comments: S/p b/l BKA   Skin:     General: Skin is warm and dry.      Capillary Refill: Capillary refill takes less than 2 seconds.   Neurological:      General: No focal deficit present.      Mental Status: He is alert and oriented to person, place, and time.   Psychiatric:         Mood and Affect: Mood normal.         Laboratory:  Recent Labs     11/23/23 1840   WBC 9.5   RBC 2.90*   HEMOGLOBIN 7.8*   HEMATOCRIT 23.6*   MCV 81.4   MCH 26.9*   MCHC 33.1   RDW 50.1*   PLATELETCT 197   MPV 9.7     Recent Labs     11/23/23 1840   SODIUM 138   POTASSIUM 4.1   CHLORIDE 101   CO2 16*   GLUCOSE 92   BUN 72*   CREATININE 16.89*   CALCIUM 8.1*     Recent Labs     11/23/23  1840   LIPASE 13   GLUCOSE 92     Recent Labs     11/23/23 1840   INR 1.16*     Recent Labs     11/23/23  1840   NTPROBNP [id number]*         Recent Labs     11/23/23  1840   TROPONINT 420*       Imaging:  DX-CHEST-PORTABLE (1 VIEW)   Final Result      No evidence of acute cardiopulmonary process.          X-Ray:  I have personally reviewed the images and compared with prior images.  EKG:  I have personally  reviewed the images and compared with prior images.    Assessment/Plan:  Justification for Admission Status  I anticipate this patient will require at least 2 months hospitalization, therefore appropriate for inpatient status.      * ESRD needing dialysis ([facility])- (present on admission)  Assessment & Plan  HD TRS  Has right chest permecath  Nephrology consulted, f/u appreciated    Hypertensive emergency  Assessment & Plan  SBP>220 in ED  Increase home amlodipine to 10 mg daily  Added hydralazine 50 mg PO 3 times daily  As needed IV labetalol, IV hydralazine, PO Catapres  HD when able to    Intractable nausea and vomiting  Assessment & Plan  Possibly due to gastroparesis  Advance diet as tolerated  Supportive antiemetic      Chronic pain syndrome  Assessment & Plan  Continue home oxycodone    Anemia of chronic disease  Assessment & Plan  Secondary to ESRD  Monitor for bleeding    S/P BKA (below knee amputation) bilateral ([facility])  Assessment & Plan  Supportive care     Metabolic acidosis  Assessment & Plan  Due to CKD/ESRD  bicarb    Diabetes ([facility])  Assessment & Plan  Lantus, ISS  statin    Class 2 obesity in adult  Assessment & Plan  Diet and lifestyle modification  Body mass index is 37.14 kg/m².          VTE prophylaxis: heparin ppx

## 2023-11-24 NOTE — CONSULTS
DATE OF SERVICE:  11/24/2023     NEPHROLOGY CONSULTATION     REQUESTING PHYSICIAN:  Dr. Miky Noriega.     REASON FOR CONSULTATION:  To evaluate and provide dialysis for patient with   end-stage renal disease.     HISTORY OF PRESENT ILLNESS:  The patient is a 38-year-old male with long-term   history of diabetes mellitus type 2 complicated with end-stage renal disease,   on hemodialysis on Tuesday, Thursday, and Saturday schedule.  The patient is   visiting from [place].  Missed dialysis treatments due to transportation   issues, presented to the emergency room with worsening shortness of breath,   nausea, vomiting, also found to be in hypertensive emergency, scheduled   emergent dialysis today to continue daily. Currently, the patient is   undergoing dialysis, tolerates well, still with shortness of breath,   significant edema.     REVIEW OF SYSTEMS:    GENERAL:  No fever or chills.  Positive for fatigue, malaise.  HEENT:  No nosebleeds, no sinus pain, no sore throat.  EYES:  No double or blurry vision, no eye pain.  RESPIRATORY:  Positive for shortness of breath, no cough, hemoptysis.  CARDIOVASCULAR:  Positive for dyspnea, no chest pain, no palpitation.    Positive for edema.  GASTROINTESTINAL:  Positive for nausea, vomiting, abdominal discomfort.  MUSCULOSKELETAL:  Positive for back pain, status post bilateral below-the-knee   amputation.  Other systems reviewed, all negative.     PAST MEDICAL HISTORY:  Hypertension, diabetes mellitus type 2, end-stage renal   disease, on hemodialysis, and severe peripheral vascular disease.     PAST SURGICAL HISTORY:  Status post bilateral BKA, tunneled dialysis catheter   placement.     FAMILY HISTORY:  No history of kidney disease. Positive for hypertension,   diabetes.     SOCIAL HISTORY:  Quit smoking.  No alcohol or drug use.     ALLERGIES:  GABAPENTIN.     OUTPATIENT MEDICATIONS:  Reviewed.     PHYSICAL EXAMINATION:    VITAL SIGNS:  Blood pressure 167/83, heart rate 94,  temperature 36 Celsius.  GENERAL APPEARANCE:  Well-developed, morbidly obese male, in no acute   distress.  HEENT:  Normocephalic, atraumatic.  Pupils equal, round, reactive to light.    Extraocular movement intact.  Nares patent.  Oropharynx clear, moist mucosa,   no erythema or exudate.  NECK:  Supple.  No lymphadenopathy, no thyromegaly appreciated.  LUNGS:  Clear to auscultation bilaterally.  No rales, wheezes, no rhonchi.  HEART:  Regular rhythm, no rub or gallop.  ABDOMEN:  Soft, nontender, nondistended.  No palpable mass.  Bowel sounds   present.  EXTREMITIES:  Bilateral BKA.  Large swellings.  SKIN:  Warm, dry.  No erythema or rash.  NEUROLOGIC:  Alert, oriented x3. No focal deficit.  Cranial nerves II-XII   grossly intact.     LABORATORY DATA:  Reviewed, revealed hemoglobin 6.9. Sodium 136, potassium   3.9, CO2 of 17, BUN 77, creatinine 17.4, phosphorus 10.2.     ASSESSMENT AND PLAN:  The patient is a 38-year-old male with multiple medical   problems, end-stage renal disease, on hemodialysis, admitted with hypertensive   emergency, uremic symptoms, volume overloaded due to missing dialysis   treatments.  1.  End-stage renal disease.  Continue dialysis daily. Emergent dialysis   today.  2.  Electrolytes noticed significantly elevated phosphorus level. To provide   renal diet phosphorus binders with meals.  To add PhosLo 3 tablets with each   meal.  3.  Anemia, low hemoglobin level.  I will schedule blood transfusion. To   monitor.  4.  Volume severely overloaded.  Continue ultrafiltration with hemodialysis as   blood pressure tolerates.  5.  Hypertensive emergency.  Hopefully, improved blood pressure, would be   under better control with increased ultrafiltration with hemodialysis.    Continue outpatient medications.     RECOMMENDATIONS:  1.  Emergent dialysis today, then continue daily.  2.  Provide renal diet.  3.  Add PhosLo 3 tablets with each meal.  4.  To adjust all medications to renal doses.  5.  To  limit fluid intake to less than 2 L/day.  6.  We will follow the patient closely.     Thank you for the consult.        ______________________________  MD LORETTA PANTOJA/JUAN M    DD:  11/24/2023 13:20  DT:  11/24/2023 13:55    Job#:  [id number]

## 2023-11-24 NOTE — DISCHARGE PLANNING
Case Management Discharge Planning    Admission Date: 11/23/2023  GMLOS:    ALOS: 1    6-Clicks ADL Score: 20  6-Clicks Mobility Score: 13  PT and/or OT Eval ordered: No  Post-acute Referrals Ordered: No  Post-acute Choice Obtained: No  Has referral(s) been sent to post-acute provider:  No      Anticipated Discharge Dispo:      DME Needed: No    Action(s) Taken: Updated Provider/Nurse on Discharge Plan and DC Assessment Complete (See below)    RN CM met with Pt to obtain assessment information. Transportation back home will be provided by her significant other Alix and she will also bring the wheelchair.    1511, Pt said he has [employer] and a pending Medicare. RN DEYANIRA sent message to [facility] to screen Pt for insurance.        Escalations Completed: None    Medically Clear: No    Next Steps: CM will continue to assist Pt with discharge needs.    Barriers to Discharge: Medical clearance    Care Transition Team Assessment    RN CM met with Pt at bedside to obtain assessment informations. Demographics verified. RN DEYANIRA introduced self and purpose of visit.   Pt lives with significant other Alix in a 1 story house house with 3 steps to main entrance.  Pt has  Significant Other Alix for support.  Pt has Dr. Manish Stein for PCP  Pt needs assistance with ADLs prior to hospitalization.  Pt uses and own wheelchair.          Information Source  Orientation Level: Oriented X4  Information Given By: Patient  Who is responsible for making decisions for patient? : Patient      Elopement Risk  Legal Hold: No    Interdisciplinary Discharge Planning  Primary Care Physician: Dr. Manish Stein  Lives with - Patient's Self Care Capacity: Significant Other  Patient or legal guardian wants to designate a caregiver: No  Support Systems: Spouse / Significant Other, Other (Comments) (S.O. family members)  Housing / Facility: 1 Story House (3 steps to main entrance)  Durable Medical Equipment: Other - Specify (wheelchair)    Discharge  Preparedness  What is your plan after discharge?: Home with help  What are your discharge supports?: Other (comment) (Significant Other)  Prior Functional Level: Needs Assist with Activities of Daily Living, Wheelchair Dependent    Functional Assesment  Prior Functional Level: Needs Assist with Activities of Daily Living, Wheelchair Dependent    Finances  Financial Barriers to Discharge: No  Prescription Coverage: Yes    Vision / Hearing Impairment  Vision Impairment : No  Hearing Impairment : No       Domestic Abuse  Have you ever been the victim of abuse or violence?: No  Physical Abuse or Sexual Abuse: No  Verbal Abuse or Emotional Abuse: No  Possible Abuse/Neglect Reported to:: Not Applicable    Psychological Assessment  History of Substance Abuse: Marijuana  History of Psychiatric Problems: No         Anticipated Discharge Information  Discharge Disposition: Discharged to home/self care (01)

## 2023-11-24 NOTE — ED NOTES
Bedside report from Rosina CAMPBELL. Pt on niecyrney in lowest, locked position, on room air, and continuous cardiac monitoring. Fall precautions in place, including fall risk sign and wrist band. Pt updated on plan of care. No needs at this time. Call light within reach. Pt aware of need for urine sample. Urinal at bedside.

## 2023-11-24 NOTE — DISCHARGE SUMMARY
Discharge Summary    CHIEF COMPLAINT ON ADMISSION  Chief Complaint   Patient presents with    N/V    Other     Last dialysis last Saturday. Normally gets dialysis T, TH, S       Reason for Admission  Ems, missed HD, increased LE edema, N/V.    Admission Date  11/23/2023    CODE STATUS  Full Code    HPI & HOSPITAL COURSE  Theodore Mc is a 38 y.o. morbidly obese male with history of ESRD on HD TRS, diabetes, hypertension, PVD s/p b/l BKA, chronic pain syndrome dependent opioid who presented 11/23/2023 with evaluation for intractable nausea and vomiting.  Patient is visiting from [facility], reported having intractable nausea and vomiting.  He reported he has not been able to tolerate oral diet, and unable to keep down his pain medication.  In ER, noted to have BUN 72, creatinine 16.89, potassium 4.1.  His SBP>220. CXR mild vascular congestion. He was given labetalol 10mg IV x2 in ED. Nephrology consulted, tentative plan for HD. Admitted to medicine service for further evaluation and treatment.     Patient had 4 L UF performed with HD on 11/24.  Received 1 unit PRBCs for Hg 6.8, no bleeding source.  Patient has right BKA stump abrasion, but no active bleeding noted.  He states he still makes urine, therefore I did prescribe lasix to patient for next 7 days in case he is unable to make it to his HD chair tomorrow.  He is also out of his klonapin, requesting short refill.  No further nausea or emesis.  Hg up to 8.6, resume Epogen tid with HD.  Started phoslo for elevated phosphorus 10.2.  follow up on right BKA stump wound with PCP, needs better fitting prosthetics.  Limit po fluid amount to 1500ml daily. BP elevated due to patient off his Klonapin.    Therefore, he is discharged in good and stable condition to home with close outpatient follow-up.    The patient recovered much more quickly than anticipated on admission.    Discharge Date  11/24/2023    FOLLOW UP ITEMS POST DISCHARGE  Follow up with his  scheduled [place] HD chair TTS schedule.  Patient states he has transportation back to [place] for tomorrow's HD session.    DISCHARGE DIAGNOSES  Principal Problem:    ESRD needing dialysis (HCC) (POA: Yes)  Active Problems:    Intractable nausea and vomiting (POA: Yes)    Hypertensive emergency (POA: Yes)    Diabetes (HCC) (POA: Yes)    Metabolic acidosis (POA: Yes)    Class 2 obesity in adult (POA: Yes)    S/P BKA (below knee amputation) bilateral (HCC) (POA: Yes)    Anemia of chronic disease (POA: Yes)    Chronic pain syndrome (POA: Yes)    Non-healing wound of amputation stump (HCC) (POA: Yes)  Resolved Problems:    Hypertensive urgency (POA: Unknown)      FOLLOW UP  No future appointments.  [facility] EMERGENCY MEDICINE  [address] [place] [id number]  [phone]  Follow up  As needed      MEDICATIONS ON DISCHARGE     Medication List        START taking these medications        Instructions   calcium acetate 667 MG Tabs tablet  Commonly known as: Phos-Lo   Take 3 Tablets by mouth 3 times a day before meals.  Dose: 2,001 mg     cloNIDine 0.1 MG/24HR Ptwk patch  Commonly known as: Catapres   Place 1 Patch on the skin every 7 days.  Dose: 1 Patch     furosemide 40 MG Tabs  Commonly known as: Lasix   Take 1 Tablet by mouth 2 times a day as needed (edema).  Dose: 40 mg            CONTINUE taking these medications        Instructions   amLODIPine 5 MG Tabs  Commonly known as: Norvasc   Take 5 mg by mouth every day.  Dose: 5 mg     clonazePAM 0.5 MG Tabs  Commonly known as: KlonoPIN   Take 1 Tablet by mouth 2 times a day for 5 days.  Dose: 0.5 mg     insulin regular 100 Unit/mL Soln  Commonly known as: Humulin R   Inject 2-10 Units under the skin 3 times a day before meals. SLIDING SCALE   151-200 = 2 units  201-250 = 4  units  251-300 = 6 units  301-350 = 8  units  351-400 = 10 units  Dose: 2-10 Units     Lantus SoloStar 100 UNIT/ML Sopn injection  Generic drug: insulin glargine   Inject 22 Units under the skin  every evening.  Dose: 22 Units     oxyCODONE immediate release 10 MG immediate release tablet  Commonly known as: Roxicodone   Take 10 mg by mouth every 6 hours as needed for Moderate Pain.  Dose: 10 mg              Allergies  Allergies   Allergen Reactions    Gabapentin Anxiety       DIET  Orders Placed This Encounter   Procedures    Diet Order Diet: Consistent CHO (Diabetic)     Standing Status:   Standing     Number of Occurrences:   1     Order Specific Question:   Diet:     Answer:   Consistent CHO (Diabetic) [4]       ACTIVITY  As tolerated.  Exercise encouraged.  Non-weight bearing RIGHT leg    CONSULTATIONS  nephrology    PROCEDURES  Emergency HD 11/24/2023 with 4 L UF removed.    LABORATORY  Lab Results   Component Value Date    SODIUM 136 11/24/2023    POTASSIUM 3.9 11/24/2023    CHLORIDE 100 11/24/2023    CO2 17 (L) 11/24/2023    GLUCOSE 97 11/24/2023    BUN 77 (H) 11/24/2023    CREATININE 17.49 (HH) 11/24/2023        Lab Results   Component Value Date    WBC 8.7 11/24/2023    HEMOGLOBIN 8.6 (L) 11/24/2023    HEMATOCRIT 20.3 (L) 11/24/2023    PLATELETCT 181 11/24/2023        Total time of the discharge process exceeds 45 minutes.

## 2023-11-24 NOTE — ED NOTES
Lab called with critical result of troponin at 420. Critical lab result read back to lab.   Dr. jJ notified of critical lab result at 0722.  Critical lab result read back by Dr. Jj.

## 2023-11-24 NOTE — ED NOTES
"Pt reports he is \"feeling okay, not great.\" States he is nauseous and that \"my leg hurts.\" When asked about his abdomen, pt also reports his pain is the same as prior to medication administration.  "

## 2023-11-24 NOTE — ED PROVIDER NOTES
ED Provider Note    CHIEF COMPLAINT  Chief Complaint   Patient presents with    N/V    Other     Last dialysis last Saturday. Normally gets dialysis T, TH, S       HPI/ROS  LIMITATION TO HISTORY   Select: : None  OUTSIDE HISTORIAN(S):  EMS twelve-lead showed no signs of STEMI blood glucose was 152 Zofran ODT was given    Theodore Mc is a 38 y.o. male who presents to the emerge department chief complaint of nausea vomiting diarrhea.  He states he is also feeling a bit short of breath and fluid overloaded.  The patient lives in [facility] has a history of end-stage renal disease on dialysis secondary to hypertension and diabetes.  He states unfortunately he has been unable to keep his medicines down over the last couple of days including chronic narcotics and states he feels is going well with withdrawal.  He states he was just admitted in [place] and had an infection of the stump of his right lower extremity but he completed antibiotics early last week.  States there is maybe a little bit of drainage happening in his  area he is having generalized cramping abdominal pain.  No bloody stools or bloody emesis.  He states he still making urine.  The last time he had dialysis was on Saturday of last week.  He denies any active chest pain at this time    PAST MEDICAL HISTORY       SURGICAL HISTORY  patient denies any surgical history    FAMILY HISTORY  No family history on file.    SOCIAL HISTORY  Social History     Tobacco Use    Smoking status: Not on file    Smokeless tobacco: Not on file   Substance and Sexual Activity    Alcohol use: Not on file    Drug use: Not on file    Sexual activity: Not on file       CURRENT MEDICATIONS  Home Medications    **Home medications have not yet been reviewed for this encounter**         ALLERGIES  Allergies   Allergen Reactions    Gabapentin        PHYSICAL EXAM  VITAL SIGNS: BP (!) 227/110   Pulse (!) 104   Temp 36.8 °C (98.3 °F) (Temporal)   Resp 18   Ht 1.88 m  "(6' 2\")   Wt (!) 135 kg (297 lb 9.9 oz)   SpO2 98%   BMI 38.21 kg/m²    Pulse OX: Pulse Oxygen level is within normal limits  Constitutional: Alert in mild distress  HENT: Normocephalic, Atraumatic, mildly dry mucous membrane  Eyes: PERound. Conjunctiva normal, non-icteric.   Heart: Regular rhythm tachycardic intact distal pulses   Lungs: Symmetrical movement, no resp distress right chest wall Vas-Cath is in place dressing is not clean  Abdomen: Non-tender, non-distended, normal bowel sounds  EXT/Back bilateral BKA's with trace pitting edema bilateral lower extremity  Skin: Warm, Dry, No erythema, No rash.   Neurologic: Alert and oriented, Grossly non-focal.       DIAGNOSTIC STUDIES / PROCEDURES  EKG  I have independently interpreted this EKG  Results for orders placed or performed during the hospital encounter of 23   EKG   Result Value Ref Range    Report       [facility] Emergency Dept.    Test Date:  2023  Pt Name:    CLAIR HOLMAN                   Department: ER  MRN:        [id number]                      Room:        10  Gender:     Male                         Technician:  :        [id number]                   Requested By:ATIYA MESSER  Order #:    [id number]                    Reading MD: Atiya Messer MD    Measurements  Intervals                                Axis  Rate:       106                          P:          0  WY:         114                          QRS:        7  QRSD:       84                           T:          82  QT:         375  QTc:        498    Interpretive Statements  Sinus tachycardia At a rate of 106 no ST elevations or ST depressions no  abnormal T wave inversions no abnormal Q waves normal intervals normal axis    No previous ECG available for comparison  Electronically Signed On 2023 18:56:45 PST by Atiya Messer MD           LABS  Labs Reviewed   CBC WITH DIFFERENTIAL - Abnormal; Notable for the following components:       " Result Value    RBC 2.90 (*)     Hemoglobin 7.8 (*)     Hematocrit 23.6 (*)     MCH 26.9 (*)     RDW 50.1 (*)     Neutrophils-Polys 78.30 (*)     Lymphocytes 11.10 (*)     Neutrophils (Absolute) 7.43 (*)     All other components within normal limits    Narrative:     Biotin intake of greater than 5 mg per day may interfere with  troponin levels, causing false low values.   BASIC METABOLIC PANEL - Abnormal; Notable for the following components:    Co2 16 (*)     Bun 72 (*)     Creatinine 16.89 (*)     Calcium 8.1 (*)     Anion Gap 21.0 (*)     All other components within normal limits    Narrative:     Biotin intake of greater than 5 mg per day may interfere with  troponin levels, causing false low values.   TROPONIN - Abnormal; Notable for the following components:    Troponin T 420 (*)     All other components within normal limits    Narrative:     Biotin intake of greater than 5 mg per day may interfere with  troponin levels, causing false low values.   PROBRAIN NATRIURETIC PEPTIDE, NT - Abnormal; Notable for the following components:    NT-proBNP 32946 (*)     All other components within normal limits    Narrative:     Biotin intake of greater than 5 mg per day may interfere with  troponin levels, causing false low values.   ESTIMATED GFR - Abnormal; Notable for the following components:    GFR (CKD-EPI) 3 (*)     All other components within normal limits    Narrative:     Biotin intake of greater than 5 mg per day may interfere with  troponin levels, causing false low values.   PROCALCITONIN - Abnormal; Notable for the following components:    Procalcitonin 0.29 (*)     All other components within normal limits    Narrative:     Biotin intake of greater than 5 mg per day may interfere with  troponin levels, causing false low values.   PROTHROMBIN TIME - Abnormal; Notable for the following components:    PT 14.9 (*)     INR 1.16 (*)     All other components within normal limits    Narrative:     Biotin intake of  "greater than 5 mg per day may interfere with  troponin levels, causing false low values.   POCT GLUCOSE DEVICE RESULTS - Abnormal; Notable for the following components:    POC Glucose, Blood 136 (*)     All other components within normal limits   LIPASE    Narrative:     Biotin intake of greater than 5 mg per day may interfere with  troponin levels, causing false low values.   LACTIC ACID   CORTISOL    Narrative:     Biotin intake of greater than 5 mg per day may interfere with  troponin levels, causing false low values.   BLOOD CULTURE    Narrative:     1 of 2 for Blood Culture x 2 sites order. Per Hospital  Policy: Only change Specimen Src: to \"Line\" if specified by  physician order.  Release to patient->Immediate   BLOOD CULTURE    Narrative:     2 of 2 blood culture x2  Sites order. Per Hospital Policy:  Only change Specimen Src: to \"Line\" if specified by physician  order.  Release to patient->Immediate   URINALYSIS   URINE CULTURE(NEW)   CBC WITH DIFFERENTIAL   RENAL FUNCTION PANEL   MAGNESIUM   HEMOGLOBIN A1C   HEPATITIS PANEL ACUTE(4 COMPONENTS)   URINE DRUG SCREEN   URINALYSIS         RADIOLOGY  I have independently interpreted the diagnostic imaging associated with this visit and am waiting the final reading from the radiologist.   My preliminary interpretation is as follows:     Chest x-ray without edema or effusion    Radiologist interpretation:     DX-CHEST-PORTABLE (1 VIEW)   Final Result      No evidence of acute cardiopulmonary process.            COURSE & MEDICAL DECISION MAKING    ED Observation Status? No    INITIAL ASSESSMENT, COURSE AND PLAN  Care Narrative:       Patient is a 38-year-old male with history of end-stage renal disease with missed several days of dialysis.  He is hypertensive and tachycardic and complaining of shortness of breath but fortunately he is not hypoxic.  Secondly of a headache or double vision he will be treated with some IV labetalol for his hypertension.  He had nausea " vomiting diarrhea will evaluate for severely worsened electrolyte abnormalities.  His abdomen has some mild generalized tenderness I do not suspect a bacterial infection but is definitely potential.  CT was ordered at this time.  He is a very difficult stick so RNs of how to use IV ultrasound multiple times.  In the meantime the patient was treated with morphine and Zofran    DISPOSITION AND DISCUSSIONS  10:30 PM  Unfortunately the patient's IV has blown a couple different times at this time I put an ultrasound-guided IV.    IV Placement Procedure Note: (with ultrasound guidance)   The patient was consented all risks benefits and alternatives were discussed.   LOCATION: R bicep  POSITION: trendelenburg.   PROCEDURE NOTE: Indication, poor access.   Sterile prep, gown, and drape protocols were used. Using ultrasound guidance, IV was placed with ultrasound guidance successfully by with a 20G long IV - had good flash and was able to cannulate fully and normal saline flowed easily. There is no localized infiltration. The line was secured by nursing staff.      Fortunately he is feeling better with the pain management his blood pressure is coming down with the labetalol.  He is not hypoxic not requiring oxygen.  He will require dialysis as his BUN is slowly climbing but it does not need to happen this evening.  Repeat examination of his abdomen shows improved tenderness he is very minimal tenderness on exam.  With a normal white blood cell count I likely suspect a viral gastroenteritis and I am going to hold off on the CT scan at this time.    Patient does have elevated troponin at this time is not complaining of any chest pain it was just the shortness of breath.  There is no signs of hypoxia or fluid overload on chest x-ray.  I suspect his troponin is quite elevated secondary to his hypertension and his end-stage renal disease as this is very common for renal patients.    CRITICAL CARE  The very real possibilty of a  deterioration of this patient's condition required the highest level of my preparedness for sudden, emergent intervention.  I provided critical care services, which included medication orders, frequent reevaluations of the patient's condition and response to treatment, ordering and reviewing test results, and discussing the case with various consultants.  The critical care time associated with the care of the patient was 45 minutes. Review chart for interventions. This time is exclusive of any other billable procedures.       I spoke with Dr. Gongora  with the medicine service for hospitalization for end-stage renal disease and hypertensive urgency.  He is excepted the patient to his service for    I have discussed management of the patient with the following physicians and DHAVAL's:  Dr gongora    Discussion of management with other [facility] or appropriate source(s): Pharmacy for iv labetalol         Decision tools and prescription drugs considered including, but not limited to: HEART Score not performed as patient is not complaining of chest pain  .    FINAL DIAGNOSIS  1. ESRD needing dialysis (HCC)    2. Nausea vomiting and diarrhea    3.  Shortness of breath  4.  Hypertensive urgency    CCT 45 min        Electronically signed by: Atiya Jj M.D., 11/23/2023 6:34 PM

## 2023-11-24 NOTE — PROGRESS NOTES
Patient was in CT for contrast scan. IV was flushed and found to be appropriate for abdomen/pelvis study. IV injection was stopped when the patient was having discomfort at the injection site. Investigated IV and found to be infiltrated. Pt was given hot pack, returned back to their room, and RN was informed of infiltration.

## 2023-11-24 NOTE — PROGRESS NOTES
Received report from Sandra CAMPBELL. Pt arrived to unit at 2330 via gurney escorted by Austin MARIE,. Assessment complete. VSS. No signs of distress noted at this time. Tele monitor in place. Monitor room notified. Pt c/o pain 6/10. Fall precautions and appropriate signs in place. Pt oriented to unit routine, call light/phone system within reach. Personal belongings within reach. Pt educated regarding fall precautions. Bed alarm is on. . Pt denies any further needs at this time.

## 2023-11-24 NOTE — ED NOTES
IV flushed prior to medication administration. After medication administration, pt begins complaining of pain to IV site. No obvious signs of infiltration but slow flush and increased pain. Removed and replaced by US guidance.

## 2023-11-24 NOTE — ASSESSMENT & PLAN NOTE
SBP>220 in ED  Increase home amlodipine to 10 mg daily  Added hydralazine 50 mg PO 3 times daily  As needed IV labetalol, IV hydralazine, PO Catapres  HD when able to

## 2023-11-25 NOTE — PROGRESS NOTES
Patient was actually recently seen. Refilled 6 months per [facility] protocol. [facility] Services Progress Note         HD today x 3.5 hours per Dr. Hummel.  Tx initiated at 0855 and ended at 1226    Pt A/O x 4, c/o chest pain 7/10, hgb of 6.9, UE edema +3, below the knee amputation, and with high blood pressure. Hooked to O2 support at 2L/min, restless d/t pain, PCN was informed. RTDC patent, dsg loose and soiled. (+) consent       NET UF: 4000 ml    Tx tolerated, UFG met. Pt became restless and wanted to end HD tx early. Health education provided, PCN gave pain meds. Pt agreed to finish the treatment. 1 unit PRBC transfused without transfusion reaction.  Blood returned, ports flushed with NS. Heparin 1000 units/ml used to lock catheter per designated amount. CVC ports clamped and capped. Aspirate heparin prior to next CVC use. See eflow sheets for further details.      Report given to MARTHA Man RN

## 2023-11-28 LAB
BACTERIA BLD CULT: NORMAL
BACTERIA BLD CULT: NORMAL
SIGNIFICANT IND 70042: NORMAL
SIGNIFICANT IND 70042: NORMAL
SITE SITE: NORMAL
SITE SITE: NORMAL
SOURCE SOURCE: NORMAL
SOURCE SOURCE: NORMAL